# Patient Record
Sex: MALE | Race: WHITE | NOT HISPANIC OR LATINO | URBAN - METROPOLITAN AREA
[De-identification: names, ages, dates, MRNs, and addresses within clinical notes are randomized per-mention and may not be internally consistent; named-entity substitution may affect disease eponyms.]

---

## 2023-07-07 ENCOUNTER — OFFICE VISIT (OUTPATIENT)
Dept: PRIMARY CARE | Facility: CLINIC | Age: 63
End: 2023-07-07
Payer: COMMERCIAL

## 2023-07-07 VITALS
TEMPERATURE: 98.2 F | OXYGEN SATURATION: 97 % | WEIGHT: 215 LBS | BODY MASS INDEX: 30.78 KG/M2 | DIASTOLIC BLOOD PRESSURE: 87 MMHG | HEIGHT: 70 IN | RESPIRATION RATE: 16 BRPM | SYSTOLIC BLOOD PRESSURE: 149 MMHG | HEART RATE: 61 BPM

## 2023-07-07 DIAGNOSIS — M19.032 PRIMARY OSTEOARTHRITIS OF LEFT WRIST: Primary | ICD-10-CM

## 2023-07-07 PROBLEM — F32.A ANXIETY AND DEPRESSION: Status: ACTIVE | Noted: 2023-07-07

## 2023-07-07 PROBLEM — N52.9 ERECTILE DYSFUNCTION: Status: ACTIVE | Noted: 2023-07-07

## 2023-07-07 PROBLEM — H53.2 DIPLOPIA: Status: ACTIVE | Noted: 2023-07-07

## 2023-07-07 PROBLEM — F41.9 ANXIETY AND DEPRESSION: Status: ACTIVE | Noted: 2023-07-07

## 2023-07-07 PROBLEM — R73.01 ELEVATED FASTING GLUCOSE: Status: ACTIVE | Noted: 2023-07-07

## 2023-07-07 PROBLEM — R68.82 DECREASED LIBIDO: Status: ACTIVE | Noted: 2023-07-07

## 2023-07-07 PROBLEM — L40.9 PSORIASIS: Status: ACTIVE | Noted: 2023-07-07

## 2023-07-07 PROBLEM — R43.0 ANOSMIA: Status: ACTIVE | Noted: 2023-07-07

## 2023-07-07 PROBLEM — L57.0 ACTINIC KERATOSES: Status: ACTIVE | Noted: 2023-07-07

## 2023-07-07 PROBLEM — K21.9 GASTROESOPHAGEAL REFLUX DISEASE: Status: ACTIVE | Noted: 2023-07-07

## 2023-07-07 PROCEDURE — 1036F TOBACCO NON-USER: CPT | Performed by: INTERNAL MEDICINE

## 2023-07-07 PROCEDURE — 99213 OFFICE O/P EST LOW 20 MIN: CPT | Performed by: INTERNAL MEDICINE

## 2023-07-07 RX ORDER — NAPROXEN 500 MG/1
500 TABLET ORAL 2 TIMES DAILY PRN
Qty: 40 TABLET | Refills: 2 | Status: SHIPPED | OUTPATIENT
Start: 2023-07-07 | End: 2024-03-03

## 2023-07-07 RX ORDER — PANTOPRAZOLE SODIUM 40 MG/1
40 TABLET, DELAYED RELEASE ORAL DAILY
COMMUNITY
End: 2023-08-11

## 2023-07-07 RX ORDER — SILDENAFIL 50 MG/1
50 TABLET, FILM COATED ORAL DAILY PRN
COMMUNITY

## 2023-07-07 RX ORDER — GUSELKUMAB 100 MG/ML
INJECTION SUBCUTANEOUS
COMMUNITY

## 2023-07-07 ASSESSMENT — ENCOUNTER SYMPTOMS
STIFFNESS: 1
NUMBNESS: 1
TINGLING: 1

## 2023-07-07 ASSESSMENT — PAIN SCALES - GENERAL: PAINLEVEL: 2

## 2023-07-07 NOTE — PROGRESS NOTES
"Subjective   Eric Walsh is a 62 y.o. male who presents for Wrist Pain and Numbness (/).      Patient states that he has had numbness in the tip on his left index finger for at least a week and then woke up a few days ago with his wrist hurting  He wore a brace one night to sleep in and it helped a little but the pain will keep him from sleeping     Pain at base of ulnar wrist without erythema,     Wrist Pain   The pain is present in the left wrist. This is a new problem. The current episode started in the past 7 days. There has been no history of extremity trauma. The problem occurs constantly. The problem has been unchanged. The patient is experiencing no pain. Associated symptoms include numbness, stiffness and tingling. He has tried nothing for the symptoms.   Neuropathy          Review of Systems   Musculoskeletal:  Positive for stiffness.   Neurological:  Positive for tingling and numbness.   All other systems reviewed and are negative.      Objective   /87   Pulse 61   Temp 36.8 °C (98.2 °F)   Resp 16   Ht 1.778 m (5' 10\")   Wt 97.5 kg (215 lb)   SpO2 97%   BMI 30.85 kg/m²       Physical Exam  Constitutional:       Appearance: Normal appearance.   Musculoskeletal:      Left wrist: No swelling.      Comments: Small nodule 4th digit PIP dorsal surface.  Good ROM and Good strength.  Vibratory sensation intact throughout.     Neurological:      Mental Status: He is alert.         Assessment/Plan   Problem List Items Addressed This Visit       Primary osteoarthritis of left wrist - Primary     Likely early arthritic changes discussed with patient.  Celebrex helped (wife Rx) and recommend antiinflammatory agent as needed.  Will consider x-rays but no trauma history.  He is beginning a drive to [place] this weekend.  Recommended wrist support at night and he will continue using his roller blade guards which have helped.  He will return as needed.            Relevant Medications    naproxen (Naprosyn) " 500 mg tablet     Encounter Diagnosis   Name Primary?    Primary osteoarthritis of left wrist Yes     Jamal Yi, DO

## 2023-07-07 NOTE — ASSESSMENT & PLAN NOTE
Likely early arthritic changes discussed with patient.  Celebrex helped (wife Rx) and recommend antiinflammatory agent as needed.  Will consider x-rays but no trauma history.  He is beginning a drive to [place] this weekend.  Recommended wrist support at night and he will continue using his roller blade guards which have helped.  He will return as needed.

## 2023-08-11 DIAGNOSIS — K21.9 GASTRO-ESOPHAGEAL REFLUX DISEASE WITHOUT ESOPHAGITIS: ICD-10-CM

## 2023-08-11 RX ORDER — PANTOPRAZOLE SODIUM 40 MG/1
40 TABLET, DELAYED RELEASE ORAL DAILY
Qty: 90 TABLET | Refills: 3 | Status: SHIPPED | OUTPATIENT
Start: 2023-08-11

## 2025-07-10 DIAGNOSIS — Z83.3 FAMILY HISTORY OF DIABETES MELLITUS: ICD-10-CM

## 2025-07-10 DIAGNOSIS — E55.9 VITAMIN D DEFICIENCY: ICD-10-CM

## 2025-07-10 DIAGNOSIS — R53.83 OTHER FATIGUE: ICD-10-CM

## 2025-07-10 DIAGNOSIS — G62.9 NEUROPATHY: Primary | ICD-10-CM

## 2025-07-10 DIAGNOSIS — Z13.220 LIPID SCREENING: ICD-10-CM

## 2025-07-10 DIAGNOSIS — Z12.5 ENCOUNTER FOR SCREENING PROSTATE SPECIFIC ANTIGEN (PSA) MEASUREMENT: ICD-10-CM

## 2025-07-31 LAB
25(OH)D3+25(OH)D2 SERPL-MCNC: 28 NG/ML (ref 30–100)
ALBUMIN SERPL-MCNC: 4.4 G/DL (ref 3.6–5.1)
ALP SERPL-CCNC: 83 U/L (ref 35–144)
ALT SERPL-CCNC: 13 U/L (ref 9–46)
ANION GAP SERPL CALCULATED.4IONS-SCNC: 7 MMOL/L (CALC) (ref 7–17)
AST SERPL-CCNC: 17 U/L (ref 10–35)
BASOPHILS # BLD AUTO: 65 CELLS/UL (ref 0–200)
BASOPHILS NFR BLD AUTO: 0.6 %
BILIRUB SERPL-MCNC: 0.5 MG/DL (ref 0.2–1.2)
BUN SERPL-MCNC: 9 MG/DL (ref 7–25)
CALCIUM SERPL-MCNC: 9.2 MG/DL (ref 8.6–10.3)
CHLORIDE SERPL-SCNC: 104 MMOL/L (ref 98–110)
CHOLEST SERPL-MCNC: 212 MG/DL
CHOLEST/HDLC SERPL: 4.2 (CALC)
CO2 SERPL-SCNC: 28 MMOL/L (ref 20–32)
CREAT SERPL-MCNC: 0.76 MG/DL (ref 0.7–1.35)
CRP SERPL-MCNC: 7 MG/L
EGFRCR SERPLBLD CKD-EPI 2021: 100 ML/MIN/1.73M2
EOSINOPHIL # BLD AUTO: 227 CELLS/UL (ref 15–500)
EOSINOPHIL NFR BLD AUTO: 2.1 %
ERYTHROCYTE [DISTWIDTH] IN BLOOD BY AUTOMATED COUNT: 12.4 % (ref 11–15)
EST. AVERAGE GLUCOSE BLD GHB EST-MCNC: 123 MG/DL
EST. AVERAGE GLUCOSE BLD GHB EST-SCNC: 6.8 MMOL/L
GLUCOSE SERPL-MCNC: 104 MG/DL (ref 65–99)
HBA1C MFR BLD: 5.9 %
HCT VFR BLD AUTO: 45.6 % (ref 38.5–50)
HDLC SERPL-MCNC: 51 MG/DL
HGB BLD-MCNC: 15.2 G/DL (ref 13.2–17.1)
LDLC SERPL CALC-MCNC: 136 MG/DL (CALC)
LYMPHOCYTES # BLD AUTO: 2387 CELLS/UL (ref 850–3900)
LYMPHOCYTES NFR BLD AUTO: 22.1 %
MCH RBC QN AUTO: 33.3 PG (ref 27–33)
MCHC RBC AUTO-ENTMCNC: 33.3 G/DL (ref 32–36)
MCV RBC AUTO: 99.8 FL (ref 80–100)
MONOCYTES # BLD AUTO: 724 CELLS/UL (ref 200–950)
MONOCYTES NFR BLD AUTO: 6.7 %
NEUTROPHILS # BLD AUTO: 7398 CELLS/UL (ref 1500–7800)
NEUTROPHILS NFR BLD AUTO: 68.5 %
NONHDLC SERPL-MCNC: 161 MG/DL (CALC)
PLATELET # BLD AUTO: 263 THOUSAND/UL (ref 140–400)
PMV BLD REES-ECKER: 10.4 FL (ref 7.5–12.5)
POTASSIUM SERPL-SCNC: 4.6 MMOL/L (ref 3.5–5.3)
PROT SERPL-MCNC: 7.6 G/DL (ref 6.1–8.1)
PSA SERPL-MCNC: 2.23 NG/ML
RBC # BLD AUTO: 4.57 MILLION/UL (ref 4.2–5.8)
SODIUM SERPL-SCNC: 139 MMOL/L (ref 135–146)
T3 SERPL-MCNC: 105 NG/DL (ref 76–181)
T4 SERPL-MCNC: 9.1 MCG/DL (ref 4.9–10.5)
TRIGL SERPL-MCNC: 128 MG/DL
TSH SERPL-ACNC: 1.09 MIU/L (ref 0.4–4.5)
VIT B12 SERPL-MCNC: 526 PG/ML (ref 200–1100)
WBC # BLD AUTO: 10.8 THOUSAND/UL (ref 3.8–10.8)